# Patient Record
Sex: FEMALE | URBAN - METROPOLITAN AREA
[De-identification: names, ages, dates, MRNs, and addresses within clinical notes are randomized per-mention and may not be internally consistent; named-entity substitution may affect disease eponyms.]

---

## 2024-01-18 ENCOUNTER — LAB OUTSIDE AN ENCOUNTER (OUTPATIENT)
Dept: URBAN - METROPOLITAN AREA CLINIC 105 | Facility: CLINIC | Age: 59
End: 2024-01-18

## 2024-01-18 ENCOUNTER — OFFICE VISIT (OUTPATIENT)
Dept: URBAN - METROPOLITAN AREA CLINIC 105 | Facility: CLINIC | Age: 59
End: 2024-01-18
Payer: COMMERCIAL

## 2024-01-18 ENCOUNTER — DASHBOARD ENCOUNTERS (OUTPATIENT)
Age: 59
End: 2024-01-18

## 2024-01-18 VITALS
HEART RATE: 75 BPM | DIASTOLIC BLOOD PRESSURE: 82 MMHG | HEIGHT: 64 IN | SYSTOLIC BLOOD PRESSURE: 135 MMHG | TEMPERATURE: 96.8 F | BODY MASS INDEX: 25.61 KG/M2 | WEIGHT: 150 LBS

## 2024-01-18 DIAGNOSIS — Z85.850 HISTORY OF THYROID CANCER: ICD-10-CM

## 2024-01-18 DIAGNOSIS — M94.9 CHONDRAL LESION: ICD-10-CM

## 2024-01-18 DIAGNOSIS — I70.0 AORTIC CALCIFICATION: ICD-10-CM

## 2024-01-18 DIAGNOSIS — J84.10 CALCIFIED GRANULOMA OF LUNG: ICD-10-CM

## 2024-01-18 PROBLEM — 47040006: Status: ACTIVE | Noted: 2024-01-18

## 2024-01-18 PROBLEM — 429254008: Status: ACTIVE | Noted: 2024-01-18

## 2024-01-18 PROBLEM — 16841131000119100: Status: ACTIVE | Noted: 2024-01-18

## 2024-01-18 PROBLEM — 50927007: Status: ACTIVE | Noted: 2024-01-18

## 2024-01-18 PROBLEM — 81817003: Status: ACTIVE | Noted: 2024-01-18

## 2024-01-18 PROCEDURE — 99244 OFF/OP CNSLTJ NEW/EST MOD 40: CPT | Performed by: INTERNAL MEDICINE

## 2024-01-18 PROCEDURE — 99204 OFFICE O/P NEW MOD 45 MIN: CPT | Performed by: INTERNAL MEDICINE

## 2024-01-18 RX ORDER — LEVOTHYROXINE SODIUM 75 UG/1
TABLET ORAL
Qty: 30 TABLET | Refills: 0 | Status: ACTIVE | COMMUNITY

## 2024-01-18 RX ORDER — AMLODIPINE BESYLATE 5 MG/1
TABLET ORAL
Qty: 30 TABLET | Refills: 1 | Status: ACTIVE | COMMUNITY

## 2024-01-18 NOTE — HPI-TODAY'S VISIT:
New patient 57 yo female with PMH of Papillary Thyroid cancer s/p thyroidectomy (2013) referred by orthopedic provider Hang Galeano for evaluation of abnormal lumbar x-ray. Patient had complaints of lower back pain with Dr. Galeano which prompted him to order a lumbar x-ray on 12/27/2023.   Significant findings include  mild degenerative changes with fairly well-preserved disc heights.  There is some posterior facet arthritis noted.  Additionally, though this is incompletely evaluated, there is some diffuse calcification noted within the costal cartilage, more on the left than the right.  The bilateral and diffuse nature of this is less concerning for malignancy/neoplasm. Patient states she informed that the calcification of the coastal cartilage was related to possible stomach cancer. Denies FH of gastric cancer. Mom was dx'd with colon CA at age 72 Last colon was in 2020 with Dr. Bernardo Guzman. 3 polyps were removed. Recommended 5 year repeat. No n/v, melena, HB, epigastric pain, regurgitation.  Recent CXR 04/2023 Calcified granuloma is present in the periphery of the right lower lobe. Cardiac silhouette is normal in size. Atherosclerotic calcification tortuosity of the thoracic aorta is present.  Currently taking Synthroid. She has no oncologist. EnEndocrinologist Dr. Monika Palacios at [facility].   She has constipation. Can go up to 1 day without a BM. Currently Benefiber daily and Align probiotics that helps.

## 2024-01-23 LAB
A/G RATIO: 1.3
ABSOLUTE BASOPHILS: 12
ABSOLUTE EOSINOPHILS: 58
ABSOLUTE LYMPHOCYTES: 1844
ABSOLUTE MONOCYTES: 290
ABSOLUTE NEUTROPHILS: 3596
ALBUMIN: 4.6
ALKALINE PHOSPHATASE: 103
ALT (SGPT): 20
ANA SCREEN, IFA: POSITIVE
AST (SGOT): 26
BASOPHILS: 0.2
BILIRUBIN, TOTAL: 0.5
BUN/CREATININE RATIO: (no result)
BUN: 17
C-REACTIVE PROTEIN, QUANT: 7.7
CA 125: 7
CA 19-9: 17
CALCIUM: 10
CARBON DIOXIDE, TOTAL: 28
CEA: <2
CHLORIDE: 101
CREATININE: 0.7
CYCLIC CITRULLINATED PEPTIDE (CCP) AB (IGG): <16
EGFR: 100
EOSINOPHILS: 1
GLOBULIN, TOTAL: 3.6
GLUCOSE: 81
HEMATOCRIT: 38.3
HEMOGLOBIN: 12.5
LYMPHOCYTES: 31.8
MCH: 24.4
MCHC: 32.6
MCV: 74.8
MONOCYTES: 5
MPV: 10.3
NEUTROPHILS: 62
PLATELET COUNT: 388
POTASSIUM: 3.8
PROTEIN, TOTAL: 8.2
RDW: 16.7
RED BLOOD CELL COUNT: 5.12
RHEUMATOID FACTOR (IGA): <5
RHEUMATOID FACTOR (IGG): <5
RHEUMATOID FACTOR (IGM): <5
SJOGREN'S ANTIBODY (SS-A): <1
SJOGREN'S ANTIBODY (SS-B): <1
SODIUM: 141
WHITE BLOOD CELL COUNT: 5.8

## 2024-01-25 ENCOUNTER — TELEPHONE ENCOUNTER (OUTPATIENT)
Dept: URBAN - METROPOLITAN AREA CLINIC 105 | Facility: CLINIC | Age: 59
End: 2024-01-25

## 2024-02-06 ENCOUNTER — LAB (OUTPATIENT)
Dept: URBAN - METROPOLITAN AREA CLINIC 105 | Facility: CLINIC | Age: 59
End: 2024-02-06

## 2024-02-06 PROBLEM — 428283002: Status: ACTIVE | Noted: 2024-02-06

## 2024-03-01 ENCOUNTER — OV EP (OUTPATIENT)
Dept: URBAN - METROPOLITAN AREA CLINIC 105 | Facility: CLINIC | Age: 59
End: 2024-03-01

## 2024-03-26 ENCOUNTER — COLON (OUTPATIENT)
Dept: URBAN - METROPOLITAN AREA SURGERY CENTER 16 | Facility: SURGERY CENTER | Age: 59
End: 2024-03-26